# Patient Record
Sex: MALE | Race: WHITE
[De-identification: names, ages, dates, MRNs, and addresses within clinical notes are randomized per-mention and may not be internally consistent; named-entity substitution may affect disease eponyms.]

---

## 2017-05-24 ENCOUNTER — HOSPITAL ENCOUNTER (OUTPATIENT)
Dept: HOSPITAL 45 - C.LABPVFM | Age: 72
Discharge: HOME | End: 2017-05-24
Attending: GENERAL PRACTICE
Payer: COMMERCIAL

## 2017-05-24 DIAGNOSIS — G62.9: Primary | ICD-10-CM

## 2017-05-24 LAB
ANION GAP SERPL CALC-SCNC: 7 MMOL/L (ref 3–11)
BUN SERPL-MCNC: 25 MG/DL (ref 7–18)
BUN/CREAT SERPL: 19.3 (ref 10–20)
CALCIUM SERPL-MCNC: 9.1 MG/DL (ref 8.5–10.1)
CHLORIDE SERPL-SCNC: 105 MMOL/L (ref 98–107)
CO2 SERPL-SCNC: 29 MMOL/L (ref 21–32)
CREAT SERPL-MCNC: 1.3 MG/DL (ref 0.6–1.4)
GLUCOSE SERPL-MCNC: 96 MG/DL (ref 70–99)
POTASSIUM SERPL-SCNC: 4.7 MMOL/L (ref 3.5–5.1)
SODIUM SERPL-SCNC: 141 MMOL/L (ref 136–145)

## 2017-07-31 ENCOUNTER — HOSPITAL ENCOUNTER (OUTPATIENT)
Dept: HOSPITAL 45 - C.LABPVFM | Age: 72
Discharge: HOME | End: 2017-07-31
Attending: GENERAL PRACTICE
Payer: COMMERCIAL

## 2017-07-31 DIAGNOSIS — I10: ICD-10-CM

## 2017-07-31 DIAGNOSIS — I25.10: Primary | ICD-10-CM

## 2017-07-31 DIAGNOSIS — E03.9: ICD-10-CM

## 2017-07-31 LAB
ANION GAP SERPL CALC-SCNC: 5 MMOL/L (ref 3–11)
BUN SERPL-MCNC: 21 MG/DL (ref 7–18)
BUN/CREAT SERPL: 17.6 (ref 10–20)
CALCIUM SERPL-MCNC: 9.3 MG/DL (ref 8.5–10.1)
CHLORIDE SERPL-SCNC: 103 MMOL/L (ref 98–107)
CHOLEST/HDLC SERPL: 2.6 {RATIO}
CO2 SERPL-SCNC: 29 MMOL/L (ref 21–32)
CREAT SERPL-MCNC: 1.2 MG/DL (ref 0.6–1.4)
GLUCOSE SERPL-MCNC: 92 MG/DL (ref 70–99)
GLUCOSE UR QL: 51 MG/DL
KETONES UR QL STRIP: 63 MG/DL
NITRITE UR QL STRIP: 99 MG/DL (ref 0–150)
PH UR: 134 MG/DL (ref 0–200)
POTASSIUM SERPL-SCNC: 4.6 MMOL/L (ref 3.5–5.1)
SODIUM SERPL-SCNC: 137 MMOL/L (ref 136–145)
TSH SERPL-ACNC: 2.42 UIU/ML (ref 0.3–4.5)
VERY LOW DENSITY LIPOPROT CALC: 20 MG/DL

## 2017-08-01 ENCOUNTER — HOSPITAL ENCOUNTER (OUTPATIENT)
Dept: HOSPITAL 45 - C.MRI | Age: 72
Discharge: HOME | End: 2017-08-01
Attending: GENERAL PRACTICE
Payer: COMMERCIAL

## 2017-08-01 DIAGNOSIS — M54.16: ICD-10-CM

## 2017-08-01 DIAGNOSIS — G62.9: Primary | ICD-10-CM

## 2017-08-01 NOTE — DIAGNOSTIC IMAGING REPORT
LUMBAR SPINE COMBINATION



CLINICAL HISTORY: 71 years-old Male presenting with pain in the left hip

radiating down to the left foot, trouble walking, symptomatic since May 2017. 



TECHNIQUE: Multisequence, multiplanar MR imaging of the lumbar spine was

performed before and after the administration of intravenous contrast. IV

contrast: 7.5 mL of Gadavist. 



COMPARISON: 4/11/2008.



FINDINGS:



Localizer images: Unremarkable.



Normal lumbar lordosis. Vertebral body heights, alignment, and signal intensity

preserved. Mild intervertebral disc desiccation with minimal height loss at

L5-S1. Mild multilevel degenerative changes with disc bulges noted at most

levels, further detailed below:



T12-L1: Normal.



L1-2: Mild disc bulge which minimally effaces the ventral thecal sac. No

significant neural foraminal narrowing.



L2-3: Minimal disc bulge without significant result.



L3-4: Mild disc bulge with moderate right and mild left neural foraminal

narrowing. In combination with ligamentum flavum hypertrophy, effacement of the

thecal sac without evidence of cauda equina impingement.



L4-5: Mild disc bulge results in moderate bilateral neural foraminal narrowing

with possible mass effect on the exiting L4 nerve roots. Effacement of the

ventral thecal sac without evidence of impingement of the cauda equina.



L5-S1: Minimal disc bulge. In combination with facet hypertrophy, mild right and

moderate left neural foraminal narrowing results. No significant spinal canal

stenosis.



Spinal cord ends in good position at superior endplate of L1. Cauda equina

normal in morphology. Paraspinal musculature normal. No abnormal enhancement on

postcontrast imaging.



IMPRESSION:

Multilevel degenerative changes not significantly progressed since the prior

exam in 2008. Varying degrees of neural foraminal narrowing most severe at L4-5.

No significant spinal canal stenosis. No abnormal enhancement.







Electronically signed by:  Aleksander Conteh M.D.

8/1/2017 6:45 PM



Dictated Date/Time:  8/1/2017 6:30 PM

## 2017-11-20 ENCOUNTER — HOSPITAL ENCOUNTER (OUTPATIENT)
Dept: HOSPITAL 45 - C.RAD1850 | Age: 72
Discharge: HOME | End: 2017-11-20
Attending: INTERNAL MEDICINE
Payer: COMMERCIAL

## 2017-11-20 ENCOUNTER — HOSPITAL ENCOUNTER (OUTPATIENT)
Dept: HOSPITAL 45 - C.LAB1850 | Age: 72
Discharge: HOME | End: 2017-11-20
Attending: INTERNAL MEDICINE
Payer: COMMERCIAL

## 2017-11-20 DIAGNOSIS — R91.1: Primary | ICD-10-CM

## 2017-11-20 DIAGNOSIS — J92.0: Primary | ICD-10-CM

## 2017-11-20 NOTE — DIAGNOSTIC IMAGING REPORT
TWO VIEW CHEST



CLINICAL HISTORY: Pleural plaques.



FINDINGS: PA and lateral chest radiographs are compared to study dated 8/17/2016

and correlated with chest CT dated 2/2/15. The patient is status post midline

sternotomy. The heart is mildly enlarged and there is atherosclerotic

calcification of the thoracic aorta. The pulmonary vasculature is noncongested.

Calcified pleural plaques are similar to previous. There is no evidence of

airspace consolidation or pleural effusion. Scattered calcified granulomas are

similar to previous. There is no pneumothorax. The skeletal structures are

osteopenic. The bony thorax appears intact.



IMPRESSION:



1. Mild cardiomegaly with no acute cardiopulmonary abnormality.



2. Calcified pleural plaques are similar to prior studies and are typical in

appearance for asbestos-related pleural disease.







Electronically signed by:  Nelson Stinson M.D.

11/20/2017 4:02 PM



Dictated Date/Time:  11/20/2017 4:00 PM

## 2018-01-03 ENCOUNTER — HOSPITAL ENCOUNTER (OUTPATIENT)
Dept: HOSPITAL 45 - C.LABPVFM | Age: 73
Discharge: HOME | End: 2018-01-03
Attending: GENERAL PRACTICE
Payer: COMMERCIAL

## 2018-01-03 DIAGNOSIS — E78.5: ICD-10-CM

## 2018-01-03 DIAGNOSIS — I10: Primary | ICD-10-CM

## 2018-01-03 LAB
ALBUMIN SERPL-MCNC: 3.9 GM/DL (ref 3.4–5)
ALP SERPL-CCNC: 107 U/L (ref 45–117)
ALT SERPL-CCNC: 33 U/L (ref 12–78)
AST SERPL-CCNC: 17 U/L (ref 15–37)
BUN SERPL-MCNC: 25 MG/DL (ref 7–18)
CALCIUM SERPL-MCNC: 9.1 MG/DL (ref 8.5–10.1)
CO2 SERPL-SCNC: 26 MMOL/L (ref 21–32)
CREAT SERPL-MCNC: 1.36 MG/DL (ref 0.6–1.4)
GLUCOSE SERPL-MCNC: 87 MG/DL (ref 70–99)
KETONES UR QL STRIP: 89 MG/DL
PH UR: 172 MG/DL (ref 0–200)
POTASSIUM SERPL-SCNC: 4.3 MMOL/L (ref 3.5–5.1)
PROT SERPL-MCNC: 7.2 GM/DL (ref 6.4–8.2)
SODIUM SERPL-SCNC: 138 MMOL/L (ref 136–145)

## 2018-01-24 ENCOUNTER — HOSPITAL ENCOUNTER (OUTPATIENT)
Dept: HOSPITAL 45 - C.LABPVFM | Age: 73
Discharge: HOME | End: 2018-01-24
Attending: INTERNAL MEDICINE
Payer: COMMERCIAL

## 2018-01-24 DIAGNOSIS — I10: Primary | ICD-10-CM

## 2018-01-24 LAB
BUN SERPL-MCNC: 21 MG/DL (ref 7–18)
CALCIUM SERPL-MCNC: 8.9 MG/DL (ref 8.5–10.1)
CO2 SERPL-SCNC: 28 MMOL/L (ref 21–32)
CREAT SERPL-MCNC: 1.26 MG/DL (ref 0.6–1.4)
GLUCOSE SERPL-MCNC: 84 MG/DL (ref 70–99)
POTASSIUM SERPL-SCNC: 4 MMOL/L (ref 3.5–5.1)
SODIUM SERPL-SCNC: 137 MMOL/L (ref 136–145)